# Patient Record
Sex: MALE | URBAN - METROPOLITAN AREA
[De-identification: names, ages, dates, MRNs, and addresses within clinical notes are randomized per-mention and may not be internally consistent; named-entity substitution may affect disease eponyms.]

---

## 2023-07-13 ENCOUNTER — POST-OPERATIVE VISIT (OUTPATIENT)
Facility: LOCATION | Age: 88
End: 2023-07-13
Payer: COMMERCIAL

## 2023-07-13 DIAGNOSIS — H35.3233 EXUDATIVE MACULAR DEGENERATION, INACTIVE SCAR, BILATERAL: ICD-10-CM

## 2023-07-13 DIAGNOSIS — Z96.1 PRESENCE OF INTRAOCULAR LENS: Primary | ICD-10-CM

## 2023-07-13 PROCEDURE — 92250 FUNDUS PHOTOGRAPHY W/I&R: CPT | Performed by: OPHTHALMOLOGY

## 2023-07-13 PROCEDURE — 99024 POSTOP FOLLOW-UP VISIT: CPT | Performed by: OPHTHALMOLOGY

## 2023-07-13 ASSESSMENT — INTRAOCULAR PRESSURE
OD: 15
OS: 15

## 2023-07-13 NOTE — IMPRESSION/PLAN
Impression: S/P s/p iol reposition and open globe OS OS - 63 Days. Presence of intraocular lens  Z96.1. Plan: s/p CEIOL OU - Patient doing well post cataract surgery. Taper medications as directed. Patient to call back for any problems or changes in postoperative course.

## 2023-07-13 NOTE — IMPRESSION/PLAN
Impression: Exudative macular degeneration, inactive scar, bilateral: Y00.5441. Plan: Dry Macular Degeneration OU- Discussed disease process with patient. Recommended AREDS2 vitamins and A[place]ler grid monitoring daily. Discussed avoiding cigarette smoke, including second-hand smoke. Return immediately for any worsening symptoms or change in vision. Patient will consider evaluation for LOW [facility] aides.

## 2023-09-14 ENCOUNTER — OFFICE VISIT (OUTPATIENT)
Facility: LOCATION | Age: 88
End: 2023-09-14
Payer: COMMERCIAL

## 2023-09-14 DIAGNOSIS — Z96.1 PRESENCE OF INTRAOCULAR LENS: ICD-10-CM

## 2023-09-14 DIAGNOSIS — H35.3233 EXUDATIVE MACULAR DEGENERATION, INACTIVE SCAR, BILATERAL: Primary | ICD-10-CM

## 2023-09-14 PROCEDURE — 92012 INTRM OPH EXAM EST PATIENT: CPT | Performed by: OPHTHALMOLOGY

## 2023-09-14 ASSESSMENT — INTRAOCULAR PRESSURE
OS: 13
OD: 12

## 2023-12-06 ENCOUNTER — OFFICE VISIT (OUTPATIENT)
Facility: LOCATION | Age: 88
End: 2023-12-06
Payer: COMMERCIAL

## 2023-12-06 DIAGNOSIS — Z96.1 PRESENCE OF INTRAOCULAR LENS: Primary | ICD-10-CM

## 2023-12-06 PROCEDURE — 92012 INTRM OPH EXAM EST PATIENT: CPT | Performed by: OPHTHALMOLOGY

## 2023-12-06 ASSESSMENT — INTRAOCULAR PRESSURE
OS: 13
OD: 12